# Patient Record
Sex: FEMALE | URBAN - METROPOLITAN AREA
[De-identification: names, ages, dates, MRNs, and addresses within clinical notes are randomized per-mention and may not be internally consistent; named-entity substitution may affect disease eponyms.]

---

## 2024-08-27 ENCOUNTER — OFFICE VISIT (OUTPATIENT)
Dept: URBAN - METROPOLITAN AREA CLINIC 48 | Facility: CLINIC | Age: 44
End: 2024-08-27

## 2024-09-25 ENCOUNTER — OFFICE VISIT (OUTPATIENT)
Dept: URBAN - METROPOLITAN AREA TELEHEALTH 2 | Facility: TELEHEALTH | Age: 44
End: 2024-09-25
Payer: COMMERCIAL

## 2024-09-25 ENCOUNTER — DASHBOARD ENCOUNTERS (OUTPATIENT)
Age: 44
End: 2024-09-25

## 2024-09-25 VITALS — WEIGHT: 200 LBS | BODY MASS INDEX: 36.8 KG/M2 | HEIGHT: 62 IN

## 2024-09-25 DIAGNOSIS — Z12.11 COLON CANCER SCREENING: ICD-10-CM

## 2024-09-25 DIAGNOSIS — R74.8 ALKALINE PHOSPHATASE ELEVATION: ICD-10-CM

## 2024-09-25 PROCEDURE — 99204 OFFICE O/P NEW MOD 45 MIN: CPT | Performed by: INTERNAL MEDICINE

## 2024-09-25 RX ORDER — HYDROCORTISONE ACETATE 25 MG/1
SUPPOSITORY RECTAL
Qty: 12 SUPPOSITORY | Status: ON HOLD | COMMUNITY

## 2024-09-25 RX ORDER — FERROUS SULFATE 325(65) MG
1 TABLET TABLET ORAL
Status: ACTIVE | COMMUNITY

## 2024-09-25 NOTE — HPI-TODAY'S VISIT:
45 y/o female presents for follow up from 2 ED visits in July. She was seen 7/22/24 for hemorrhoid pain and slight blood when wiping. Exam in ED revealed an external hemorrhoid for which she was given hydrocortisone cream which she used briefly with resolution. She denies significant constipation, diarrhea, and denies blood in the stool. She has never had a colonoscopy. She has an uncle with h/o colon polyps and her great uncle had CRC.   She was seen in the ED again 7/31/24 for supbsternal chest pain which she states felt like pressure. She states pain was non-exertional. She has not had pain prior or since, and went to the ED because her family was concerned. She was not feeling overly stressed at the time. Chest pain resolved while in the ED. She had a negative troponin X 2; EKG interpretation in ED with no acute evidence of ischemia or ST changes; CXR unremarkable. She was given Rx for Pepcid but states she didn't take it. She denies any reflux sypmtoms, dyspepsia, or other upper GI distress. She has had no recurrent chest pain.   Labs at ED visit showed normal Hgb 11.8 and MCV low at 73.2. She has Rx for iron for anemia felt to be secondary to menstrual bleeding; admits to h/o fibroids and endometriosis and states there was a period she had bleeding for a month. She denies melena or hematochezia and occult stool in July was negative. She denies regular NSAID use. LFT's were normal aside from alk phos of 175 which has been elevated dating back to 2019.

## 2024-10-24 ENCOUNTER — TELEPHONE ENCOUNTER (OUTPATIENT)
Dept: URBAN - METROPOLITAN AREA CLINIC 44 | Facility: CLINIC | Age: 44
End: 2024-10-24

## 2024-10-28 ENCOUNTER — TELEPHONE ENCOUNTER (OUTPATIENT)
Dept: URBAN - METROPOLITAN AREA CLINIC 44 | Facility: CLINIC | Age: 44
End: 2024-10-28

## 2024-10-30 ENCOUNTER — OFFICE VISIT (OUTPATIENT)
Dept: URBAN - METROPOLITAN AREA SURGERY CENTER 28 | Facility: SURGERY CENTER | Age: 44
End: 2024-10-30

## 2024-10-30 RX ORDER — FERROUS SULFATE 325(65) MG
1 TABLET TABLET ORAL
Status: ACTIVE | COMMUNITY

## 2024-10-30 RX ORDER — HYDROCORTISONE ACETATE 25 MG/1
SUPPOSITORY RECTAL
Qty: 12 SUPPOSITORY | Status: ON HOLD | COMMUNITY